# Patient Record
Sex: MALE | Race: BLACK OR AFRICAN AMERICAN
[De-identification: names, ages, dates, MRNs, and addresses within clinical notes are randomized per-mention and may not be internally consistent; named-entity substitution may affect disease eponyms.]

---

## 2018-08-21 ENCOUNTER — HOSPITAL ENCOUNTER (EMERGENCY)
Dept: HOSPITAL 87 - ER | Age: 68
Discharge: LEFT BEFORE DISCHARGE | End: 2018-08-21
Payer: MEDICARE

## 2018-08-21 VITALS — DIASTOLIC BLOOD PRESSURE: 59 MMHG | SYSTOLIC BLOOD PRESSURE: 90 MMHG

## 2018-08-21 VITALS — BODY MASS INDEX: 21.55 KG/M2 | WEIGHT: 145.51 LBS | HEIGHT: 69 IN

## 2018-08-21 DIAGNOSIS — D64.9: ICD-10-CM

## 2018-08-21 DIAGNOSIS — F17.200: ICD-10-CM

## 2018-08-21 DIAGNOSIS — E87.1: ICD-10-CM

## 2018-08-21 DIAGNOSIS — F12.10: ICD-10-CM

## 2018-08-21 DIAGNOSIS — K40.90: Primary | ICD-10-CM

## 2018-08-21 LAB
CHLORIDE SERPL-SCNC: 95 MEQ/L (ref 98–107)
ERYTHROCYTE [DISTWIDTH] IN BLOOD BY AUTOMATED COUNT: 16.2 % (ref 11.6–14.6)
HCT VFR BLD AUTO: 36.4 % (ref 42–52)
HGB BLD-MCNC: 11.9 G/DL (ref 14–18)
LYMPHOCYTES NFR BLD MANUAL: 8 % (ref 20–50)
MCH RBC QN AUTO: 26.3 PG (ref 28–32)
MCV RBC AUTO: 80.4 FL (ref 80–94)
MONOCYTES NFR BLD MANUAL: 21 % (ref 2–8)
NEUTS BAND NFR BLD MANUAL: 3 % (ref 1–6)
NEUTS SEG NFR BLD MANUAL: 68 % (ref 45–75)
PLATELET # BLD AUTO: 277 X1000/UL (ref 130–400)
PLATELET # BLD EST: NORMAL 10*3/UL
PMV BLD AUTO: 7.9 FL (ref 7.4–10.4)
RBC # BLD AUTO: 4.52 MILL/UL (ref 4.7–6.1)

## 2018-08-21 PROCEDURE — 80053 COMPREHEN METABOLIC PANEL: CPT

## 2018-08-21 PROCEDURE — 85025 COMPLETE CBC W/AUTO DIFF WBC: CPT

## 2018-08-21 PROCEDURE — 76857 US EXAM PELVIC LIMITED: CPT

## 2018-08-21 PROCEDURE — 99285 EMERGENCY DEPT VISIT HI MDM: CPT

## 2018-08-21 PROCEDURE — 93976 VASCULAR STUDY: CPT

## 2018-08-21 PROCEDURE — 36415 COLL VENOUS BLD VENIPUNCTURE: CPT

## 2018-08-21 PROCEDURE — 76870 US EXAM SCROTUM: CPT

## 2019-03-11 ENCOUNTER — HOSPITAL ENCOUNTER (EMERGENCY)
Dept: HOSPITAL 54 - ER | Age: 69
Discharge: NURSING, REHAB OR LONG-TERM CARE | End: 2019-03-11
Payer: MEDICARE

## 2019-03-11 VITALS — WEIGHT: 99 LBS | BODY MASS INDEX: 15.54 KG/M2 | HEIGHT: 67 IN

## 2019-03-11 VITALS — DIASTOLIC BLOOD PRESSURE: 83 MMHG | SYSTOLIC BLOOD PRESSURE: 123 MMHG

## 2019-03-11 DIAGNOSIS — B02.30: Primary | ICD-10-CM

## 2019-03-11 DIAGNOSIS — L01.09: ICD-10-CM

## 2019-03-11 NOTE — NUR
NADIA, FROM [facility]  C/O RIGHT EYE SWELLING WITH DISCHARGES x 3 DAYS, 
pain 8/10 throbbing, axox4 stabl at this tme

## 2019-08-26 ENCOUNTER — HOSPITAL ENCOUNTER (OUTPATIENT)
Dept: HOSPITAL 72 - RAD | Age: 69
Discharge: HOME | End: 2019-08-26
Payer: COMMERCIAL

## 2019-08-26 DIAGNOSIS — L02.31: Primary | ICD-10-CM

## 2019-08-26 PROCEDURE — 72192 CT PELVIS W/O DYE: CPT

## 2019-08-27 NOTE — DIAGNOSTIC IMAGING REPORT
Indication: Left buttock swelling and pain

 

Technique: Noncontrast spiral acquisitions obtained through the pelvis. No IV

contrast utilized, per patient request Multiplanar reconstructions generated. Total

dose length product 339.81 mGycm. CTDIvol(s) 9.27 mGy.  Dose reduction achieved using

automated exposure control

 

 

Comparison: none

 

Findings: There is a complex soft tissue collection with some central low-attenuation

in the left posterior perineal/buttock region. The main portion of this measures 4 cm

oblique AP by 3.5 cm oblique transverse. This is confluent with an area of

superficial skin thickening. There is a tubular component of this which extends

cephalad for about 7 cm, and there also appears to be a second extension which

imaging in a 2.5 x 2.7 area of density which is posterior and superior to the main

collection. This may be a sinus tract extending to the posterior skin surface. There

is infiltration of the surrounding fat which extends into the proximal thigh, the

posterior thigh, and posterior buttock region. There is a slight degree of skin

thickening in the right perineal region as well.

 

There is an indirect right inguinal hernia which contains a loop of small bowel.

Small bowel entering the hernia does not appear to be dilated. However, there are

dilated small bowel loops in the left lower quadrant. There is some exhalation and

possibly a small shallow diverticulum or diverticula in the posterior bladder wall.

The prostate is somewhat enlarged The remaining pelvic viscera are unremarkable.

 

The bones are unremarkable.

 

Impression: Complex soft tissue collection with some central low-attenuation in the

posterior perineal/medial buttock region, as described. This likely represents a

complex abscess, although this cannot be stated for certain in the absence of IV

contrast demonstration. This probably also demonstrates multiple fistulous tracts

Further evaluation with sonography should be considered as clinically indicated

 

Induration of the medial right buttock, probably reflects inflammation/inflammation

 

Indirect right inguinal hernia, containing loops of small bowel. No definite

obstruction or strangulation

 

Dilated left lower quadrant small bowel loops, concerning for small bowel

obstruction, incompletely characterized as the abdomen is not included in the exam.

Correlate with clinical findings

 

Prostatomegaly

 

This agrees with the preliminary interpretation provided overnight by [facility]

teleradiology service. 

 

 

 

The CT scanner at [facility] is accredited by the [employer]

[employer]iology and the scans are performed using protocols designed to limit radiation

exposure to as low as reasonably achievable to attain images of sufficient resolution

adequate for diagnostic evaluation.